# Patient Record
Sex: FEMALE | Race: WHITE | Employment: UNEMPLOYED | URBAN - NONMETROPOLITAN AREA
[De-identification: names, ages, dates, MRNs, and addresses within clinical notes are randomized per-mention and may not be internally consistent; named-entity substitution may affect disease eponyms.]

---

## 2019-05-17 ENCOUNTER — HOSPITAL ENCOUNTER (EMERGENCY)
Age: 34
Discharge: HOME | End: 2019-05-17
Payer: COMMERCIAL

## 2019-05-17 ENCOUNTER — APPOINTMENT (OUTPATIENT)
Dept: GENERAL RADIOLOGY | Age: 34
End: 2019-05-17
Payer: COMMERCIAL

## 2019-05-17 VITALS
TEMPERATURE: 98.5 F | DIASTOLIC BLOOD PRESSURE: 97 MMHG | RESPIRATION RATE: 18 BRPM | BODY MASS INDEX: 37.67 KG/M2 | SYSTOLIC BLOOD PRESSURE: 145 MMHG | OXYGEN SATURATION: 98 % | HEIGHT: 67 IN | WEIGHT: 240 LBS | HEART RATE: 97 BPM

## 2019-05-17 DIAGNOSIS — S29.011A MUSCLE STRAIN OF CHEST WALL, INITIAL ENCOUNTER: Primary | ICD-10-CM

## 2019-05-17 PROCEDURE — 71101 X-RAY EXAM UNILAT RIBS/CHEST: CPT

## 2019-05-17 PROCEDURE — 6370000000 HC RX 637 (ALT 250 FOR IP): Performed by: NURSE PRACTITIONER

## 2019-05-17 PROCEDURE — 99283 EMERGENCY DEPT VISIT LOW MDM: CPT

## 2019-05-17 RX ORDER — ORPHENADRINE CITRATE 100 MG/1
100 TABLET, EXTENDED RELEASE ORAL 2 TIMES DAILY
Qty: 20 TABLET | Refills: 0 | Status: SHIPPED | OUTPATIENT
Start: 2019-05-17 | End: 2019-05-27

## 2019-05-17 RX ORDER — TIZANIDINE 4 MG/1
4 TABLET ORAL EVERY 6 HOURS PRN
Status: DISCONTINUED | OUTPATIENT
Start: 2019-05-17 | End: 2019-05-17 | Stop reason: HOSPADM

## 2019-05-17 RX ORDER — LIDOCAINE 50 MG/G
1 PATCH TOPICAL DAILY
Qty: 30 PATCH | Refills: 0 | Status: SHIPPED | OUTPATIENT
Start: 2019-05-17

## 2019-05-17 RX ORDER — LIDOCAINE 4 G/G
1 PATCH TOPICAL DAILY
Status: DISCONTINUED | OUTPATIENT
Start: 2019-05-17 | End: 2019-05-17 | Stop reason: HOSPADM

## 2019-05-17 RX ADMIN — TIZANIDINE 4 MG: 4 TABLET ORAL at 17:57

## 2019-05-17 ASSESSMENT — ENCOUNTER SYMPTOMS
BACK PAIN: 1
COUGH: 0

## 2019-05-17 ASSESSMENT — PAIN SCALES - GENERAL: PAINLEVEL_OUTOF10: 10

## 2019-05-17 ASSESSMENT — PAIN DESCRIPTION - PAIN TYPE: TYPE: ACUTE PAIN

## 2019-05-17 NOTE — ED PROVIDER NOTES
[facility] [address] [place] [address]       Chief Complaint   Patient presents with    Cough    Rib Injury       Nurses Notes reviewed and I agree except as noted in the HPI. HISTORY OF PRESENT ILLNESS    Leila Kirk rashid 28 y.o. female who presents to the ED for evaluation of a possible rib injury caused by a cough. The patient states she began experiencing a cough May 8th that caused a \"pop\" in her ribs. She states she has experienced this before, during the summer of last year, and the pain resolved when her cough resolved. This time, the pain has not resolved with the cough, so she decided to come in to be evaluated. The patient states she felt lightheaded when she felt the pop, but states that was consistent with her last experience as well. She states the pain radiates from the front to her back and into her neck, making her neck stiff. She states it is disturbing her sleep, and she is experiencing minor shortness of breath due to the pain from taking a full breath. She states she cannot move her right arm fully or it will exacerbate the pain as well. She states ice packs and Tylenol are not providing relief. She notes she is allergic to antiinflammatory medications, causing Hives and vomiting. She states she regularly takes anti-depressant and anxiety medications, as well as GERD medications. Pain description:  Onset: May 8th with cough  Location: Right ribs  Duration: over a week  Character: Not given  Aggravating factors: breathing, movement of the right arm  Radiation: into back and neck  Timing: with cough  Severity: 10/10    Experienced previously: yes    HPI was provided by the patient. REVIEW OF SYSTEMS     Review of Systems   Constitutional: Negative for chills and fever. HENT: Negative for congestion, rhinorrhea and sneezing. Respiratory: Positive for shortness of breath. Negative for cough (resolved now). Cardiovascular: Positive for chest pain. Musculoskeletal: Positive for arthralgias (Rib pain from coughing, heard a \"pop\", pain since, exacerbated by breathing and movement of right arm), back pain (secondary to rib pain) and neck pain (mild, secondary to rib pain). Skin: Negative for color change. Allergic/Immunologic: Negative for immunocompromised state. Neurological: Negative for dizziness and headaches. PAST MEDICAL HISTORY     Past Medical History:   Diagnosis Date    Depression     Migraine        SURGICALHISTORY      has a past surgical history that includes pyeloplasty and fracture surgery. CURRENT MEDICATIONS       Previous Medications    ALBUTEROL (PROVENTIL HFA) 108 (90 BASE) MCG/ACT INHALER    Inhale 2 puffs into the lungs every 4 hours as needed for Wheezing or Shortness of Breath (Space out to every 6 hours as symptoms improve) Space out to every 6 hours as symptoms improve. AMITRIPTYLINE (ELAVIL) 25 MG TABLET    Take 25 mg by mouth nightly. CHOLECALCIFEROL (VITAMIN D PO)    Take by mouth    CYCLOBENZAPRINE (FLEXERIL) 10 MG TABLET    Take 1 tablet by mouth 3 times daily as needed for Muscle spasms (Muscle pain) Caution will cause drowsiness    FLUOXETINE (PROZAC) 20 MG CAPSULE    Take 40 mg by mouth daily    METHYLPREDNISOLONE (MEDROL DOSEPACK) 4 MG TABLET    Take by mouth as directed. OMEPRAZOLE (PRILOSEC) 20 MG CAPSULE    Take 20 mg by mouth daily    OXYCODONE-ACETAMINOPHEN (PERCOCET) 5-325 MG PER TABLET    Take 2 tablets by mouth every 4 hours as needed for Pain       ALLERGIES     is allergic to asa [aspirin] and nsaids. FAMILY HISTORY     indicated that her mother is alive. She indicated that her father is alive. She indicated that her brother is alive. She indicated that her maternal grandmother is . family history includes Cancer in her brother, maternal grandmother, and paternal grandfather.     SOCIAL HISTORY       Social History     Socioeconomic History    Marital status: Single     Spouse name: Not on file    Number of children: Not on file    Years of education: Not on file    Highest education level: Not on file   Occupational History    Not on file   Social Needs    Financial resource strain: Not on file    Food insecurity:     Worry: Not on file     Inability: Not on file    Transportation needs:     Medical: Not on file     Non-medical: Not on file   Tobacco Use    Smoking status: Current Every Day Smoker     Packs/day: 1.00     Years: 15.00     Pack years: 15.00   Substance and Sexual Activity    Alcohol use: Yes     Comment: occasion    Drug use: No    Sexual activity: Not on file   Lifestyle    Physical activity:     Days per week: Not on file     Minutes per session: Not on file    Stress: Not on file   Relationships    Social connections:     Talks on phone: Not on file     Gets together: Not on file     Attends Yarsanism service: Not on file     Active member of club or organization: Not on file     Attends meetings of clubs or organizations: Not on file     Relationship status: Not on file    Intimate partner violence:     Fear of current or ex partner: Not on file     Emotionally abused: Not on file     Physically abused: Not on file     Forced sexual activity: Not on file   Other Topics Concern    Not on file   Social History Narrative    Not on file       PHYSICAL EXAM     INITIAL VITALS:  height is 5' 7\" (1.702 m) and weight is 240 lb (108.9 kg). Her oral temperature is 98.5 °F (36.9 °C). Her blood pressure is 145/97 (abnormal) and her pulse is 97. Her respiration is 18 and oxygen saturation is 98%. Physical Exam   Constitutional: She is oriented to person, place, and time. She appears well-developed and well-nourished. HENT:   Head: Normocephalic and atraumatic. Right Ear: External ear normal.   Left Ear: External ear normal.   Eyes: Conjunctivae are normal. Right eye exhibits no discharge. Left eye exhibits no discharge. No scleral icterus.    Neck: Normal range of motion. Neck supple. No JVD present. Pulmonary/Chest: Effort normal. No stridor. No respiratory distress. She exhibits tenderness (latissimus dorsi and pectoralis minor) and bony tenderness (right lateral ribs). Abdominal: Soft. She exhibits no distension. Musculoskeletal: Normal range of motion. She exhibits no edema. Neurological: She is alert and oriented to person, place, and time. She exhibits normal muscle tone. GCS eye subscore is 4. GCS verbal subscore is 5. GCS motor subscore is 6. Skin: Skin is warm and dry. She is not diaphoretic. No erythema. Psychiatric: She has a normal mood and affect. Her behavior is normal.   Nursing note and vitals reviewed. DIFFERENTIAL DIAGNOSIS:   Strain, sprain, fracture    DIAGNOSTIC RESULTS     EKG: All EKG's are interpreted by the Emergency Department Physician who eithersigns or Co-signs this chart in the absence of a cardiologist.    None    RADIOLOGY: non-plainfilm images(s) such as CT, Ultrasound and MRI are read by the radiologist.  Plain radiographic images are visualized and preliminarily interpreted by the emergency physician unless otherwise stated below. XR RIBS RIGHT INCLUDE CHEST (MIN 3 VIEWS)    (Results Pending)         LABS:   Labs Reviewed - No data to display    EMERGENCY DEPARTMENT COURSE:   Vitals:    Vitals:    05/17/19 1709   BP: (!) 145/97   Pulse: 97   Resp: 18   Temp: 98.5 °F (36.9 °C)   TempSrc: Oral   SpO2: 98%   Weight: 240 lb (108.9 kg)   Height: 5' 7\" (1.702 m)   MDM  The patient was seen within the ED today for the evaluation of a possible rib injury caused by a cough. The patient arrived in no acute distress and in stable condition. Within the department, I observed the patient's vital signs to be within acceptable range, with the exception of an elevated BP. On exam, I appreciated tenderness of the latissimus dorsi and pectoralis minor and tenderness to the right lateral ribs.      Radiological studies within the department will result after the end of my shift, and  patient care will be transferred to Allyssa Kyle CNP. Please see his note for further work up and disposition information. Medications   lidocaine 4 % external patch 1 patch (1 patch Transdermal Patch Applied 5/17/19 1757)   tiZANidine (ZANAFLEX) tablet 4 mg (4 mg Oral Given 5/17/19 1757)       Patient was seenindependently by myself. The patient's final impression and disposition and plan was determined by myself. CRITICAL CARE:   None    CONSULTS:  None    PROCEDURES:  None    FINAL IMPRESSION   No diagnosis found. DISPOSITION/PLAN   Please see the note of Allyssa Kyle CNP    PATIENT REFERREDTO:  No follow-up provider specified. DISCHARGE MEDICATIONS:  New Prescriptions    No medications on file       (Please note that portions of this note were completed with a voice recognition program.  Efforts were made to edit the dictations but occasionally words are mis-transcribed.)    Scribe:  Salinas Dorado 5/17/19 5:17 PM Scribing for and in the presence of George Stevenson CNP. Signed by: Desire Gamble, 05/17/19 6:01 PM    Provider:  I personally performed the services described in the documentation,reviewed and edited the documentation which was dictated to the scribe in my presence, and it accurately records my words and actions.     George Stevenson CNP 05/17/19 6:01 PM    EMELINA Steel CNP  05/18/19 2165

## 2019-05-17 NOTE — ED TRIAGE NOTES
Pt to ed with c/o rib pain after coughing approx a week ago. Pt reports that she heard a pop and has experienced pain since.

## 2019-05-18 ASSESSMENT — ENCOUNTER SYMPTOMS
RHINORRHEA: 0
SHORTNESS OF BREATH: 1
COLOR CHANGE: 0

## 2019-05-18 NOTE — ED PROVIDER NOTES
[facility]  eMERGENCY dEPARTMENT eNCOUnter     Pt Name: Cele Atwood  MRN: [id number]  [place] 1985  Date of evaluation: 5/18/19        Mid-level provider Note:    I have personally performed and/or participated in the history, exam and medical decision making and agree with all pertinent clinical information as noted by the previous provider. I have also reviewed and agree with the past medical, family and social history unless otherwise noted. I have personally performed a face to face diagnostic evaluation on this patient. I have reviewed the previous provider's findings and agree. Evaluation: This patient was signed out at the end of his shift by [facility][1] KEGEAN das. At that time patient was awaiting imaging results. X-ray revealed no acute findings. Patient was notified of this. At this time I believe that it is safe to discharge the patient. She is given prescriptions for Lidoderm patches as well as Norflex. She is instructed to follow-up with her PCP and she is encouraged to return to the emergency department as needed for any new or worsening symptoms.       1. Muscle strain of chest wall, initial encounter          DISPOSITION/PLAN  PATIENT REFERRED TO:  EMELINA Lau [facility] [place]  [phone]          DISCHARGE MEDICATIONS:  Discharge Medication List as of 5/17/2019  6:35 PM      START taking these medications    Details   lidocaine (LIDODERM) 5 % Place 1 patch onto the skin daily 12 hours on, 12 hours off., Disp-30 patch, R-0Print      orphenadrine (NORFLEX) 100 MG extended release tablet Take 1 tablet by mouth 2 times daily for 10 days, Disp-20 tablet, R-0Print               EMELINA Abarca CNP, APRN - CNP  05/18/19 0122

## 2023-03-17 ENCOUNTER — APPOINTMENT (OUTPATIENT)
Dept: GENERAL RADIOLOGY | Age: 38
End: 2023-03-17
Payer: COMMERCIAL

## 2023-03-17 ENCOUNTER — HOSPITAL ENCOUNTER (EMERGENCY)
Age: 38
Discharge: HOME | End: 2023-03-17
Payer: COMMERCIAL

## 2023-03-17 VITALS
DIASTOLIC BLOOD PRESSURE: 98 MMHG | SYSTOLIC BLOOD PRESSURE: 138 MMHG | TEMPERATURE: 98.2 F | HEART RATE: 72 BPM | BODY MASS INDEX: 34.53 KG/M2 | HEIGHT: 67 IN | WEIGHT: 220 LBS | OXYGEN SATURATION: 98 % | RESPIRATION RATE: 16 BRPM

## 2023-03-17 DIAGNOSIS — S93.492A SPRAIN OF ANTERIOR TALOFIBULAR LIGAMENT OF LEFT ANKLE, INITIAL ENCOUNTER: Primary | ICD-10-CM

## 2023-03-17 PROCEDURE — 73630 X-RAY EXAM OF FOOT: CPT

## 2023-03-17 PROCEDURE — 99283 EMERGENCY DEPT VISIT LOW MDM: CPT

## 2023-03-17 PROCEDURE — 6370000000 HC RX 637 (ALT 250 FOR IP)

## 2023-03-17 PROCEDURE — 73610 X-RAY EXAM OF ANKLE: CPT

## 2023-03-17 RX ORDER — ACETAMINOPHEN 325 MG/1
650 TABLET ORAL ONCE
Status: COMPLETED | OUTPATIENT
Start: 2023-03-17 | End: 2023-03-17

## 2023-03-17 RX ADMIN — ACETAMINOPHEN 650 MG: 325 TABLET ORAL at 18:29

## 2023-03-17 ASSESSMENT — PAIN SCALES - GENERAL
PAINLEVEL_OUTOF10: 4
PAINLEVEL_OUTOF10: 6

## 2023-03-17 ASSESSMENT — PAIN DESCRIPTION - LOCATION: LOCATION: ANKLE

## 2023-03-17 ASSESSMENT — PAIN DESCRIPTION - ORIENTATION: ORIENTATION: LEFT

## 2023-03-17 NOTE — DISCHARGE INSTRUCTIONS
Please return to emergency department for worsening ability to walk, reinjury to the foot/ankle or any other emergent healthcare needs. Please attend [address] [place] [address] walk-in clinic for continued management of sprained ankle. Please make follow-up with your PCP in 1 week to ensure proper healing/improvement of symptoms.

## 2023-03-17 NOTE — ED PROVIDER NOTES
[facility] [address] [address] EMERGENCY DEPT      EMERGENCY MEDICINE     Pt Name: Bernie Lanza  MRN: [id number]  [place] 1985  Date of evaluation: 3/17/2023  Provider: EMELINA Whitfield CNP    CHIEF COMPLAINT       Chief Complaint   Patient presents with    Ankle Pain     Rolled left ankle PTA     HISTORY OF PRESENT ILLNESS   Leila Kirk is a pleasant 44 y.o. female who presents to the emergency department from home by personal transportation with her father, Ed Gastelum. She was going down the steps approximately 1:30pm this afternoon where she had \"folded (my left ankle) on itself and felt a little burning/popping sensation. I was barely able to bear weight afterward\". Rated 5/10 currently, 7/10 with activity. Has not tried treatments aside from elevation which did not improve the discomfort. Denies history of injury to left ankle but has had surgery on her right ankle before. She has seen Dr. Zain Goetz from [facility] in the past for her right ankle. Denies chest pain shortness of breath nausea vomiting or fever or chills. History obtained from  patient and father  PASTMEDICAL HISTORY     Past Medical History:   Diagnosis Date    Depression     Migraine        There is no problem list on file for this patient. SURGICAL HISTORY       Past Surgical History:   Procedure Laterality Date    FRACTURE SURGERY      right ankle    PYELOPLASTY         CURRENT MEDICATIONS       Previous Medications    ALBUTEROL (PROVENTIL HFA) 108 (90 BASE) MCG/ACT INHALER    Inhale 2 puffs into the lungs every 4 hours as needed for Wheezing or Shortness of Breath (Space out to every 6 hours as symptoms improve) Space out to every 6 hours as symptoms improve. AMITRIPTYLINE (ELAVIL) 25 MG TABLET    Take 25 mg by mouth nightly.     CHOLECALCIFEROL (VITAMIN D PO)    Take by mouth    CYCLOBENZAPRINE (FLEXERIL) 10 MG TABLET    Take 1 tablet by mouth 3 times daily as needed for Muscle spasms (Muscle pain) Caution will cause drowsiness FLUOXETINE (PROZAC) 20 MG CAPSULE    Take 40 mg by mouth daily    LIDOCAINE (LIDODERM) 5 %    Place 1 patch onto the skin daily 12 hours on, 12 hours off. METHYLPREDNISOLONE (MEDROL DOSEPACK) 4 MG TABLET    Take by mouth as directed. OMEPRAZOLE (PRILOSEC) 20 MG CAPSULE    Take 20 mg by mouth daily    OXYCODONE-ACETAMINOPHEN (PERCOCET) 5-325 MG PER TABLET    Take 2 tablets by mouth every 4 hours as needed for Pain       ALLERGIES     is allergic to asa [aspirin] and nsaids. FAMILY HISTORY     She indicated that her mother is alive. She indicated that her father is alive. She indicated that her brother is alive. She indicated that her maternal grandmother is . SOCIAL HISTORY       Social History     Tobacco Use    Smoking status: Every Day     Packs/day: 1.00     Years: 15.00     Pack years: 15.00     Types: Cigarettes   Substance Use Topics    Alcohol use: Yes     Comment: occasion    Drug use: No       PHYSICAL EXAM       ED Triage Vitals [23 1805]   BP Temp Temp Source Heart Rate Resp SpO2 Height Weight   (!) 138/98 98.2 °F (36.8 °C) Oral 72 16 98 % 5' 7\" (1.702 m) 220 lb (99.8 kg)       Physical Exam  Vitals and nursing note reviewed. Constitutional:       General: She is not in acute distress. Appearance: Normal appearance. She is not ill-appearing, toxic-appearing or diaphoretic. Cardiovascular:      Pulses:           Dorsalis pedis pulses are 2+ on the right side and 2+ on the left side. Posterior tibial pulses are 2+ on the right side and 2+ on the left side. Musculoskeletal:      Right foot: Normal range of motion. No deformity, bunion, Charcot foot, foot drop or prominent metatarsal heads. Left foot: Decreased range of motion. No deformity, bunion, Charcot foot or foot drop. Feet:    Feet:      Left foot:      Skin integrity: Skin integrity normal.      Comments: Mild soft tissue swelling at the indicated site per read circles above.     CMS intact, symmetrical bilaterally distal to site of injury. There is decreased range of motion with varus/valgus maneuvers and internal/external rotation as well as plantar and dorsiflexion. The area of swelling is tender to touch. There is no ecchymosis, redness, warmth to the area. No dermatologic manifestation of injury. Skin:     General: Skin is warm and dry. Capillary Refill: Capillary refill takes less than 2 seconds. Neurological:      General: No focal deficit present. Mental Status: She is alert and oriented to person, place, and time. Psychiatric:         Mood and Affect: Mood normal.         Behavior: Behavior normal.     FORMAL DIAGNOSTIC RESULTS     RADIOLOGY: Interpretation per the Radiologist below, if available at the time of this note (none if blank):    XR FOOT LEFT (MIN 3 VIEWS)   Final Result    No fracture. **This report has been created using voice recognition software. It may contain minor errors which are inherent in voice recognition technology. **      Final report electronically signed by DR Antwan Dwyer on 3/17/2023 7:09 PM      XR ANKLE LEFT (MIN 3 VIEWS)   Final Result      No fracture. **This report has been created using voice recognition software. It may contain minor errors which are inherent in voice recognition technology. **      Final report electronically signed by DR Antwan Dwyer on 3/17/2023 7:06 PM        LABS: (none if blank)  Labs Reviewed - No data to display    (Any cultures that may have been sent were not resulted at the time of this patient visit)    [address] / ED COURSE:     1) Number and Complexity of Problems            Problem List This Visit:         Chief Complaint   Patient presents with    Ankle Pain     Rolled left ankle PTA          Differential Diagnosis includes (but not limited to):   Ankle sprain, most likely grade 2        Diagnoses Considered but I have low suspicion of:   Tubular fracture Pertinent Comorbid Conditions:    History of multiple ankle sprains  2)  Data Reviewed (none if left blank)          My Independent interpretations:     EKG:      N/A    Imaging: Left ankle/foot x-ray demonstrates no fracture dislocation or malalignment. Labs:      N/A          Decision Rules/Clinical Scores utilized: [place] Ankle Rule    Location of pain:  Midfoot    Bone tenderness base of fifth metatarsal  No navicular bony tenderness  Maintained ability to bear weight immediately following injury and emergency department    Total: Metatarsal tenderness indicates need for ankle/foot x-ray            External Documentation Reviewed:         Previous patient encounter documents & history available on EMR was reviewed            See Formal Diagnostic Results above for the lab and radiology tests and orders. 3)  Treatment and Disposition         ED Reassessment: Arrives complaining of 5/10 pain at rest, 7/10 with ambulation and activity. Treated with p.o. Tylenol/ice pack. Case discussed with consulting clinician: N/A         Shared Decision-Making was performed and disposition discussed with the        Patient/Family and questions answered. Discussed the need for pain medication. Patient reports significant allergy to \"anti-inflammatories\". Shared decision making opted to forego opiate pain medications and treat only with p.o. Tylenol. Social determinants of health impacting treatment or disposition: N/A         Code Status: Full code    Summary of Patient Presentation:      MDM  Number of Diagnoses or Management Options  Sprain of anterior talofibular ligament of left ankle, initial encounter: new, needed workup  Diagnosis management comments: No fracture on x-ray. Most likely differential remains grade 2 ankle sprain. There is no bruising/laxity upon assessment or ROM. Therefore, it is unlikely that tendons are completely ruptured.   Especially considering she was able to bear weight immediately after and in emergency department. Amount and/or Complexity of Data Reviewed  Tests in the radiology section of CPT®: ordered and reviewed    Risk of Complications, Morbidity, and/or Mortality  Presenting problems: moderate  Diagnostic procedures: moderate  Management options: low    /   Vitals Reviewed:    Vitals:    03/17/23 1805   BP: (!) 138/98   Pulse: 72   Resp: 16   Temp: 98.2 °F (36.8 °C)   TempSrc: Oral   SpO2: 98%   Weight: 220 lb (99.8 kg)   Height: 5' 7\" (1.702 m)     The patient was seen and examined. Appropriate diagnostic testing was performed and results reviewed with the patient. The results of pertinent diagnostic studies and exam findings were discussed. The patients provisional diagnosis and plan of care were discussed with the patient and present family who expressed understanding. Any medications were reviewed and indications and risks of medications were discussed with the patient /family present. Strict verbal and written return precautions, instructions and appropriate follow-up provided to  the patient. ED Medications administered this visit:  (None if blank)  Medications   acetaminophen (TYLENOL) tablet 650 mg (650 mg Oral Given 3/17/23 6065)     PROCEDURES: (None if blank)  Procedures:     CRITICAL CARE: (None if blank)    DISCHARGE PRESCRIPTIONS: (None if blank)  New Prescriptions    No medications on file     FINAL IMPRESSION      1.  Sprain of anterior talofibular ligament of left ankle, initial encounter        DISPOSITION/PLAN   DISPOSITION Decision To Discharge 03/17/2023 07:14:01 PM    OUTPATIENT FOLLOW UP THE PATIENT:  Laura EMELINA Greenfield CNP  [address]  [address] [address] [facility]  [phone]    In 1 week      [address]  [address] [facility] [phone]  Call       Antonio Pollard, [address], APRN - CNP  03/17/23 1566

## 2023-07-24 ENCOUNTER — HOSPITAL ENCOUNTER (EMERGENCY)
Age: 38
Discharge: HOME | End: 2023-07-25
Attending: EMERGENCY MEDICINE
Payer: COMMERCIAL

## 2023-07-24 DIAGNOSIS — G43.909 MIGRAINE WITHOUT STATUS MIGRAINOSUS, NOT INTRACTABLE, UNSPECIFIED MIGRAINE TYPE: Primary | ICD-10-CM

## 2023-07-24 PROCEDURE — 6360000002 HC RX W HCPCS: Performed by: EMERGENCY MEDICINE

## 2023-07-24 PROCEDURE — 99284 EMERGENCY DEPT VISIT MOD MDM: CPT

## 2023-07-24 PROCEDURE — 6360000002 HC RX W HCPCS

## 2023-07-24 PROCEDURE — 96372 THER/PROPH/DIAG INJ SC/IM: CPT

## 2023-07-24 RX ORDER — DEXAMETHASONE SODIUM PHOSPHATE 4 MG/ML
8 INJECTION, SOLUTION INTRA-ARTICULAR; INTRALESIONAL; INTRAMUSCULAR; INTRAVENOUS; SOFT TISSUE ONCE
Status: COMPLETED | OUTPATIENT
Start: 2023-07-24 | End: 2023-07-24

## 2023-07-24 RX ORDER — ORPHENADRINE CITRATE 100 MG/1
100 TABLET, EXTENDED RELEASE ORAL 2 TIMES DAILY
Status: DISCONTINUED | OUTPATIENT
Start: 2023-07-25 | End: 2023-07-25 | Stop reason: HOSPADM

## 2023-07-24 RX ORDER — METOCLOPRAMIDE HYDROCHLORIDE 5 MG/ML
10 INJECTION INTRAMUSCULAR; INTRAVENOUS ONCE
Status: COMPLETED | OUTPATIENT
Start: 2023-07-24 | End: 2023-07-24

## 2023-07-24 RX ORDER — DIPHENHYDRAMINE HYDROCHLORIDE 50 MG/ML
25 INJECTION INTRAMUSCULAR; INTRAVENOUS ONCE
Status: COMPLETED | OUTPATIENT
Start: 2023-07-24 | End: 2023-07-24

## 2023-07-24 RX ORDER — DEXAMETHASONE SODIUM PHOSPHATE 4 MG/ML
8 INJECTION, SOLUTION INTRA-ARTICULAR; INTRALESIONAL; INTRAMUSCULAR; INTRAVENOUS; SOFT TISSUE ONCE
Status: DISCONTINUED | OUTPATIENT
Start: 2023-07-24 | End: 2023-07-24

## 2023-07-24 RX ADMIN — DIPHENHYDRAMINE HYDROCHLORIDE 25 MG: 50 INJECTION, SOLUTION INTRAMUSCULAR; INTRAVENOUS at 23:10

## 2023-07-24 RX ADMIN — DEXAMETHASONE SODIUM PHOSPHATE 8 MG: 4 INJECTION, SOLUTION INTRA-ARTICULAR; INTRALESIONAL; INTRAMUSCULAR; INTRAVENOUS; SOFT TISSUE at 23:08

## 2023-07-24 RX ADMIN — METOCLOPRAMIDE 10 MG: 5 INJECTION, SOLUTION INTRAMUSCULAR; INTRAVENOUS at 23:10

## 2023-07-24 ASSESSMENT — PAIN DESCRIPTION - LOCATION: LOCATION: HEAD

## 2023-07-24 ASSESSMENT — PAIN - FUNCTIONAL ASSESSMENT: PAIN_FUNCTIONAL_ASSESSMENT: 0-10

## 2023-07-24 ASSESSMENT — PAIN SCALES - GENERAL: PAINLEVEL_OUTOF10: 5

## 2023-07-25 VITALS
TEMPERATURE: 98.4 F | HEART RATE: 88 BPM | SYSTOLIC BLOOD PRESSURE: 128 MMHG | DIASTOLIC BLOOD PRESSURE: 83 MMHG | RESPIRATION RATE: 19 BRPM | OXYGEN SATURATION: 99 %

## 2023-07-25 PROCEDURE — 6370000000 HC RX 637 (ALT 250 FOR IP): Performed by: EMERGENCY MEDICINE

## 2023-07-25 RX ORDER — ORPHENADRINE CITRATE 100 MG/1
100 TABLET, EXTENDED RELEASE ORAL DAILY PRN
Qty: 3 TABLET | Refills: 0 | Status: SHIPPED | OUTPATIENT
Start: 2023-07-25 | End: 2023-07-28

## 2023-07-25 RX ADMIN — ORPHENADRINE CITRATE 100 MG: 100 TABLET, EXTENDED RELEASE ORAL at 00:07

## 2023-07-25 ASSESSMENT — ENCOUNTER SYMPTOMS
NAUSEA: 0
WHEEZING: 0
COUGH: 0
ABDOMINAL PAIN: 0
PHOTOPHOBIA: 1
BLOOD IN STOOL: 0
COLOR CHANGE: 0
FACIAL SWELLING: 0
RHINORRHEA: 1
DIARRHEA: 0
SHORTNESS OF BREATH: 0
CONSTIPATION: 0
VOMITING: 0
ABDOMINAL DISTENTION: 0
SORE THROAT: 0

## 2023-07-25 NOTE — ED PROVIDER NOTES
Rate and Rhythm: Normal rate and regular rhythm. Pulses: Normal pulses. Heart sounds: Normal heart sounds. Pulmonary:      Effort: Pulmonary effort is normal. No respiratory distress. Breath sounds: Normal breath sounds. No wheezing, rhonchi or rales. Abdominal:      General: Abdomen is flat. Bowel sounds are normal. There is no distension. Palpations: Abdomen is soft. There is no mass. Tenderness: There is no abdominal tenderness. There is no right CVA tenderness, left CVA tenderness or guarding. Musculoskeletal:         General: No swelling. Normal range of motion. Cervical back: Normal range of motion and neck supple. Right lower leg: No edema. Left lower leg: No edema. Skin:     General: Skin is warm and dry. Findings: No lesion or rash. Neurological:      General: No focal deficit present. Mental Status: She is alert and oriented to person, place, and time. Cranial Nerves: No cranial nerve deficit. Sensory: No sensory deficit. Motor: No weakness. Coordination: Coordination normal.      Gait: Gait normal.      Deep Tendon Reflexes: Reflexes normal.   Psychiatric:         Mood and Affect: Mood normal.         Behavior: Behavior normal.         Thought Content: Thought content normal.         Judgment: Judgment normal.           MEDICAL DECISION MAKING   Initial Assessment:   Headache  History and presentation most consistent with migraine headache but also considered on the differential were herpes zoster, occipital neuralgia, CVA. Plan:   Benadryl and Reglan  We will give droperidol if no relief.         ED RESULTS   Laboratory results:  Labs Reviewed - No data to display    Radiologic studies results:  No orders to display       ED Medications administered this visit:   Medications   orphenadrine (NORFLEX) extended release tablet 100 mg (100 mg Oral Given 7/25/23 0007)   diphenhydrAMINE (BENADRYL) injection 25 mg (25 mg

## 2023-07-25 NOTE — DISCHARGE INSTRUCTIONS
Continue to take tylenol as needed for headache with addition of norflex daily as needed for severe headache    Return to ED if any alarming symptoms develop such as weakness, numbness, tingling, visual changes, difficulty walking, etc

## 2024-05-04 ENCOUNTER — APPOINTMENT (OUTPATIENT)
Dept: GENERAL RADIOLOGY | Age: 39
End: 2024-05-04
Payer: COMMERCIAL

## 2024-05-04 ENCOUNTER — HOSPITAL ENCOUNTER (EMERGENCY)
Age: 39
Discharge: HOME | End: 2024-05-05
Payer: COMMERCIAL

## 2024-05-04 VITALS
WEIGHT: 230 LBS | TEMPERATURE: 98.2 F | RESPIRATION RATE: 16 BRPM | BODY MASS INDEX: 36.1 KG/M2 | SYSTOLIC BLOOD PRESSURE: 146 MMHG | OXYGEN SATURATION: 98 % | HEIGHT: 67 IN | HEART RATE: 78 BPM | DIASTOLIC BLOOD PRESSURE: 85 MMHG

## 2024-05-04 DIAGNOSIS — M25.511 ACUTE PAIN OF RIGHT SHOULDER: Primary | ICD-10-CM

## 2024-05-04 PROCEDURE — 73030 X-RAY EXAM OF SHOULDER: CPT

## 2024-05-04 PROCEDURE — 6370000000 HC RX 637 (ALT 250 FOR IP): Performed by: PHYSICIAN ASSISTANT

## 2024-05-04 PROCEDURE — 99283 EMERGENCY DEPT VISIT LOW MDM: CPT

## 2024-05-04 RX ORDER — TRAMADOL HYDROCHLORIDE 50 MG/1
50 TABLET ORAL ONCE
Status: COMPLETED | OUTPATIENT
Start: 2024-05-04 | End: 2024-05-04

## 2024-05-04 RX ADMIN — TRAMADOL HYDROCHLORIDE 50 MG: 50 TABLET ORAL at 23:28

## 2024-05-04 ASSESSMENT — PAIN - FUNCTIONAL ASSESSMENT: PAIN_FUNCTIONAL_ASSESSMENT: 0-10

## 2024-05-04 ASSESSMENT — PAIN SCALES - GENERAL: PAINLEVEL_OUTOF10: 2

## 2024-05-05 RX ORDER — TRAMADOL HYDROCHLORIDE 50 MG/1
50 TABLET ORAL EVERY 6 HOURS PRN
Qty: 12 TABLET | Refills: 0 | Status: SHIPPED | OUTPATIENT
Start: 2024-05-05 | End: 2024-05-08

## 2024-05-05 NOTE — ED PROVIDER NOTES
05/04/24 2204   BP: (!) 146/85   Pulse: 78   Resp: 16   Temp: 98.2 °F (36.8 °C)   TempSrc: Oral   SpO2: 98%   Weight: 104.3 kg (230 lb)   Height: 1.702 m (5' 7\")     MDM:  The patient was seen and evaluated by me in the intake area. Vital signs were reviewed and noted stable. Physical exam revealed tenderness over the AC joint and anterior shoulder.  The patient was neurovascularly intact and had decreased extension secondary to pain.  Appropriate testing was ordered. Results were reviewed by me upon completion. Results showed no fracture dislocation or acute process. Results were discussed with the patient and discharge plan was discussed.  Patient medicated with Ultram and put in an arm sling.  Strict instructions about the arm sling given.  Patient was comfortable plan of care.  I have given the patient strict written and verbal instructions about care at home, follow-up, and signs and symptoms of worsening of condition and they did verbalize understanding.         CRITICAL CARE:   None    CONSULTS:  None    PROCEDURES:  None    FINAL IMPRESSION      1. Acute pain of right shoulder          DISPOSITION/PLAN     1. Acute pain of right shoulder        PATIENT REFERRED TO:  [facility]  [address] [facility] [address] [phone]  Schedule an appointment as soon as possible for a visit in 1 week        DISCHARGE MEDICATIONS:  Discharge Medication List as of 5/5/2024 12:13 AM        START taking these medications    Details   traMADol (ULTRAM) 50 MG tablet Take 1 tablet by mouth every 6 hours as needed for Pain for up to 3 days. Intended supply: 3 days. Take lowest dose possible to manage pain Max Daily Amount: 200 mg, Disp-12 tablet, R-0Normal             (Please note that portions of this note were completed with a voice recognition program.  Efforts were made to edit the dictations but occasionally words are mis-transcribed.)    Urvashi Lopez PA-C 05/05/24 12:15

## 2024-05-05 NOTE — ED TRIAGE NOTES
Pt arrives ambulatory from lobby with c/o shoulder pain. Pt states she rolled over in bed and felt a pop. Pt rates pain 2 out of 0-10 at this time.